# Patient Record
Sex: FEMALE | Employment: UNEMPLOYED | URBAN - METROPOLITAN AREA
[De-identification: names, ages, dates, MRNs, and addresses within clinical notes are randomized per-mention and may not be internally consistent; named-entity substitution may affect disease eponyms.]

---

## 2017-04-01 ENCOUNTER — HOSPITAL ENCOUNTER (EMERGENCY)
Facility: CLINIC | Age: 22
Discharge: HOME | End: 2017-04-01
Attending: EMERGENCY MEDICINE | Admitting: EMERGENCY MEDICINE

## 2017-04-01 VITALS
DIASTOLIC BLOOD PRESSURE: 86 MMHG | SYSTOLIC BLOOD PRESSURE: 126 MMHG | TEMPERATURE: 98.7 F | WEIGHT: 180 LBS | HEART RATE: 83 BPM | RESPIRATION RATE: 16 BRPM | OXYGEN SATURATION: 99 % | BODY MASS INDEX: 27.37 KG/M2

## 2017-04-01 DIAGNOSIS — N76.0 BACTERIAL VAGINOSIS: ICD-10-CM

## 2017-04-01 DIAGNOSIS — B96.89 BACTERIAL VAGINOSIS: ICD-10-CM

## 2017-04-01 DIAGNOSIS — N97.0 ANOVULATORY (DYSFUNCTIONAL UTERINE) BLEEDING: ICD-10-CM

## 2017-04-01 LAB
B-HCG SERPL-ACNC: <1 IU/L (ref 0–5)
BASOPHILS # BLD AUTO: 0 10E9/L (ref 0–0.2)
BASOPHILS NFR BLD AUTO: 0.1 %
DIFFERENTIAL METHOD BLD: ABNORMAL
EOSINOPHIL # BLD AUTO: 0.1 10E9/L (ref 0–0.7)
EOSINOPHIL NFR BLD AUTO: 1.5 %
ERYTHROCYTE [DISTWIDTH] IN BLOOD BY AUTOMATED COUNT: 12.3 % (ref 10–15)
HCG UR QL: NEGATIVE
HCT VFR BLD AUTO: 40.3 % (ref 35–47)
HGB BLD-MCNC: 14.2 G/DL (ref 11.7–15.7)
IMM GRANULOCYTES # BLD: 0 10E9/L (ref 0–0.4)
IMM GRANULOCYTES NFR BLD: 0.3 %
INTERNAL QC OK POCT: YES
LYMPHOCYTES # BLD AUTO: 3.7 10E9/L (ref 0.8–5.3)
LYMPHOCYTES NFR BLD AUTO: 46.8 %
MCH RBC QN AUTO: 33.6 PG (ref 26.5–33)
MCHC RBC AUTO-ENTMCNC: 35.2 G/DL (ref 31.5–36.5)
MCV RBC AUTO: 96 FL (ref 78–100)
MICRO REPORT STATUS: ABNORMAL
MONOCYTES # BLD AUTO: 0.4 10E9/L (ref 0–1.3)
MONOCYTES NFR BLD AUTO: 5.2 %
NEUTROPHILS # BLD AUTO: 3.6 10E9/L (ref 1.6–8.3)
NEUTROPHILS NFR BLD AUTO: 46.1 %
NRBC # BLD AUTO: 0 10*3/UL
NRBC BLD AUTO-RTO: 0 /100
PLATELET # BLD AUTO: 162 10E9/L (ref 150–450)
RBC # BLD AUTO: 4.22 10E12/L (ref 3.8–5.2)
SPECIMEN SOURCE: ABNORMAL
WBC # BLD AUTO: 7.9 10E9/L (ref 4–11)
WET PREP SPEC: ABNORMAL

## 2017-04-01 PROCEDURE — 87210 SMEAR WET MOUNT SALINE/INK: CPT | Performed by: EMERGENCY MEDICINE

## 2017-04-01 PROCEDURE — 87491 CHLMYD TRACH DNA AMP PROBE: CPT | Performed by: EMERGENCY MEDICINE

## 2017-04-01 PROCEDURE — 84702 CHORIONIC GONADOTROPIN TEST: CPT | Performed by: EMERGENCY MEDICINE

## 2017-04-01 PROCEDURE — 99284 EMERGENCY DEPT VISIT MOD MDM: CPT | Performed by: EMERGENCY MEDICINE

## 2017-04-01 PROCEDURE — 99283 EMERGENCY DEPT VISIT LOW MDM: CPT | Mod: Z6 | Performed by: EMERGENCY MEDICINE

## 2017-04-01 PROCEDURE — 81025 URINE PREGNANCY TEST: CPT | Performed by: EMERGENCY MEDICINE

## 2017-04-01 PROCEDURE — 87591 N.GONORRHOEAE DNA AMP PROB: CPT | Performed by: EMERGENCY MEDICINE

## 2017-04-01 PROCEDURE — 85025 COMPLETE CBC W/AUTO DIFF WBC: CPT | Performed by: EMERGENCY MEDICINE

## 2017-04-01 RX ORDER — METRONIDAZOLE 500 MG/1
500 TABLET ORAL 2 TIMES DAILY
Qty: 14 TABLET | Refills: 0 | Status: SHIPPED | OUTPATIENT
Start: 2017-04-01 | End: 2017-04-08

## 2017-04-01 ASSESSMENT — ENCOUNTER SYMPTOMS
EYE REDNESS: 0
HEADACHES: 0
COLOR CHANGE: 0
SHORTNESS OF BREATH: 0
NECK STIFFNESS: 0
ENDOCRINE COMMENTS: LACTATING
ARTHRALGIAS: 0
CONFUSION: 0
DIFFICULTY URINATING: 0
ABDOMINAL PAIN: 0
FEVER: 0

## 2017-04-01 NOTE — ED AVS SNAPSHOT
[facility], Emergency Department    [address]    [place] [address]    Phone:  [phone]    Fax:  [phone]                                       Shira Cruz   MRN: [id number]    Department:  [facility], Emergency Department   Date of Visit:  4/1/2017           Patient Information     Date Of Birth          1995        Your diagnoses for this visit were:     Anovulatory (dysfunctional uterine) bleeding     Bacterial vaginosis        You were seen by Payton Andrade MD.        Discharge Instructions       Please make an appointment to follow up with OB/Gyn--[facility] (phone: [phone]) or [facility]yn--[facility] (phone: [phone]) in 7-10 days.  Take Flagyl as directed for bacterial vaginosis.  Do not drink alcohol with this medication.     Discharge References/Attachments     BACTERIAL VAGINOSIS (BV) (ENGLISH)    DYSFUNCTIONAL UTERINE BLEEDING (ENGLISH)      24 Hour Appointment Hotline       To make an appointment at any [place] clinic, call [phone]). If you don't have a family doctor or clinic, we will help you find one. [place] clinics are conveniently located to serve the needs of you and your family.             Review of your medicines      START taking        Dose / Directions Last dose taken    metroNIDAZOLE 500 MG tablet   Commonly known as:  FLAGYL   Dose:  500 mg   Quantity:  14 tablet        Take 1 tablet (500 mg) by mouth 2 times daily for 7 days   Refills:  0          Our records show that you are taking the medicines listed below. If these are incorrect, please call your family doctor or clinic.        Dose / Directions Last dose taken    ibuprofen 200 MG tablet   Commonly known as:  ADVIL/MOTRIN   Dose:  400 mg   Quantity:  60 tablet        Take 2 tablets by mouth every 6 hours as needed for pain.   Refills:  0        KEFLEX PO   Dose:  500 mg        Take 500 mg by mouth   Refills:  0        oxyCODONE 5 MG IR  "tablet   Commonly known as:  ROXICODONE   Dose:  5 mg   Quantity:  5 tablet        Take 1 tablet by mouth every 4 hours as needed for pain for 5 doses.   Refills:  0                Prescriptions were sent or printed at these locations (1 Prescription)                   Other Prescriptions                Printed at Department/Unit printer (1 of 1)         metroNIDAZOLE (FLAGYL) 500 MG tablet                Procedures and tests performed during your visit     CBC with platelets differential    Chlamydia trachomatis PCR    HCG quantitative pregnancy (blood)    Neisseria gonorrhoeae PCR    Wet prep    hCG qual urine POCT      Orders Needing Specimen Collection     None      Pending Results     Date and Time Order Name Status Description    4/1/2017 2108 Neisseria gonorrhoeae PCR In process     4/1/2017 2108 Chlamydia trachomatis PCR In process             Pending Culture Results     Date and Time Order Name Status Description    4/1/2017 2108 Neisseria gonorrhoeae PCR In process     4/1/2017 2108 Chlamydia trachomatis PCR In process             Thank you for choosing [facility]       Thank you for choosing [facility] for your care. Our goal is always to provide you with excellent care. Hearing back from our patients is one way we can continue to improve our services. Please take a few minutes to complete the written survey that you may receive in the mail after you visit with us. Thank you!        5[employer]uan Information     5[employer]uan lets you send messages to your doctor, view your test results, renew your prescriptions, schedule appointments and more. To sign up, go to [email or web address] . Click on \"Log in\" on the left side of the screen, which will take you to the Welcome page. Then click on \"Sign up Now\" on the right side of the page.     You will be asked to enter the access code listed below, as well as some personal information. Please follow the directions to create your username and password.     Your access code " is: [email or web address]  Expires: 2017 10:38 PM     Your access code will  in 90 days. If you need help or a new code, please call your [place] clinic or [phone].        Care EveryWhere ID     This is your Care EveryWhere ID. This could be used by other organizations to access your [place] medical records  [id number]        After Visit Summary       This is your record. Keep this with you and show to your community pharmacist(s) and doctor(s) at your next visit.

## 2017-04-01 NOTE — ED AVS SNAPSHOT
[facility], [place], Emergency Department    [address]    [place] [address]    Phone:  [phone]    Fax:  [phone]                                       Shira Cruz   MRN: [id number]    Department:  [facility], Emergency Department   Date of Visit:  4/1/2017           After Visit Summary Signature Page     I have received my discharge instructions, and my questions have been answered. I have discussed any challenges I see with this plan with the nurse or doctor.    ..........................................................................................................................................  Patient/Patient Representative Signature      ..........................................................................................................................................  Patient Representative Print Name and Relationship to Patient    ..................................................               ................................................  Date                                            Time    ..........................................................................................................................................  Reviewed by Signature/Title    ...................................................              ..............................................  Date                                                            Time

## 2017-04-02 LAB
C TRACH DNA SPEC QL NAA+PROBE: NORMAL
N GONORRHOEA DNA SPEC QL NAA+PROBE: NORMAL
SPECIMEN SOURCE: NORMAL
SPECIMEN SOURCE: NORMAL

## 2017-04-02 NOTE — ED PROVIDER NOTES
[facility] EMERGENCY DEPARTMENT ([facility])    2017    ED 5  9:04 PM   History     Chief Complaint   Patient presents with     Vaginal Bleeding     Red/brown vaginal bleeding started this am and changed two pads. don't know how far but home test positive on 2017. abdominal cramping and pressure pain.     HPI  Shira Cruz is a 21 year old  female who presents with reddish brown vaginal bleeding that started this morning. She had a positive home pregnancy test on 17. She is , with miscarriages at 3 months and 4 months. She developed brown discharge with moderate vaginal bleeding and cramping this morning. She has changed 2 pads so far. She is lactating. Patient doesn't know what her blood type is. Per [facility] records and blood testing at [facility] she is Rh positive. Per Epic records she had a Depo Provera shot in 2016.  She has abdominal cramping.  She denies fevers, nausea, or vomiting.    Additional history as of the patient's 2nd pregnancy was a molar pregnancy.  It ended in 2015.  Her breasts had leaked milk during that pregnancy.  She received a depo shot in 2016.  Her breasts have been leaking milk since that time.  She missed her depo shot in February.    I have reviewed the Medications, Allergies, Past Medical and Surgical History, and Social History in the [facility] system.  PAST MEDICAL HISTORY: History reviewed. No pertinent past medical history.    PAST SURGICAL HISTORY: History reviewed. No pertinent surgical history.    FAMILY HISTORY: No family history on file.    SOCIAL HISTORY:   Social History   Substance Use Topics     Smoking status: Current Every Day Smoker     Smokeless tobacco: Never Used     Alcohol use No       Patient's Medications   New Prescriptions    METRONIDAZOLE (FLAGYL) 500 MG TABLET    Take 1 tablet (500 mg) by mouth 2 times daily for 7 days   Previous Medications    CEPHALEXIN (KEFLEX PO)    Take 500  mg by mouth    IBUPROFEN (ADVIL,MOTRIN) 200 MG TABLET    Take 2 tablets by mouth every 6 hours as needed for pain.    OXYCODONE (ROXICODONE) 5 MG IMMEDIATE RELEASE TABLET    Take 1 tablet by mouth every 4 hours as needed for pain for 5 doses.   Modified Medications    No medications on file   Discontinued Medications    No medications on file          Allergies   Allergen Reactions     Banana Swelling     Cantaloupe [Melon] Swelling     Celery Oil Swelling      Review of Systems   Constitutional: Negative for fever.   HENT: Negative for congestion.    Eyes: Negative for redness.   Respiratory: Negative for shortness of breath.    Cardiovascular: Negative for chest pain.   Gastrointestinal: Negative for abdominal pain.   Endocrine:        Lactating   Genitourinary: Positive for pelvic pain (cramping), vaginal bleeding and vaginal discharge. Negative for difficulty urinating.   Musculoskeletal: Negative for arthralgias and neck stiffness.   Skin: Negative for color change.   Neurological: Negative for headaches.   Psychiatric/Behavioral: Negative for confusion.       Physical Exam   BP: 137/84  Pulse: 102  Temp: 98.5  F (36.9  C)  Resp: 16  SpO2: 96 %  Physical Exam  Physical Exam   Constitutional:   well nourished, well developed, resting comfortably   HENT:   Head: Normocephalic and atraumatic.   Eyes: Conjunctivae are normal. Pupils are equal, round, and reactive to light.   TMS are clear, pharynx has no erythema or exudate, mucous membranes are moist  Neck:   no adenopathy, no bony tenderness  Cardiovascular: regular rate and rhythm without murmurs or gallops  Pulmonary/Chest: Clear to auscultation bilaterally, with no wheezes or retractions. No respiratory distress.  GI: Soft with good bowel sounds.  Non-tender, non-distended, with no guarding, no rebound, no peritoneal signs.   : Shaved pudenda, external genitalia is within normal limits, brownish discharge in vaginal vault, os is closed, no cervical motion  tenderness.  Back:  No bony or CVA tenderness   Musculoskeletal:  no edema or clubbing   Skin: Skin is warm and dry. No rash noted.   Neurological: alert and oriented to person, place, and time. Nonfocal exam  Psychiatric:  normal mood and affect.   ED Course     ED Course     Procedures             Critical Care time:  none             Results for orders placed or performed during the hospital encounter of 04/01/17 (from the past 24 hour(s))   hCG qual urine POCT   Result Value Ref Range    HCG Qual Urine Negative neg    Internal QC OK Yes    HCG quantitative pregnancy (blood)   Result Value Ref Range    HCG Quantitative Serum <1 0 - 5 IU/L   CBC with platelets differential   Result Value Ref Range    WBC 7.9 4.0 - 11.0 10e9/L    RBC Count 4.22 3.8 - 5.2 10e12/L    Hemoglobin 14.2 11.7 - 15.7 g/dL    Hematocrit 40.3 35.0 - 47.0 %    MCV 96 78 - 100 fl    MCH 33.6 (H) 26.5 - 33.0 pg    MCHC 35.2 31.5 - 36.5 g/dL    RDW 12.3 10.0 - 15.0 %    Platelet Count 162 150 - 450 10e9/L    Diff Method Automated Method     % Neutrophils 46.1 %    % Lymphocytes 46.8 %    % Monocytes 5.2 %    % Eosinophils 1.5 %    % Basophils 0.1 %    % Immature Granulocytes 0.3 %    Nucleated RBCs 0 0 /100    Absolute Neutrophil 3.6 1.6 - 8.3 10e9/L    Absolute Lymphocytes 3.7 0.8 - 5.3 10e9/L    Absolute Monocytes 0.4 0.0 - 1.3 10e9/L    Absolute Eosinophils 0.1 0.0 - 0.7 10e9/L    Absolute Basophils 0.0 0.0 - 0.2 10e9/L    Abs Immature Granulocytes 0.0 0 - 0.4 10e9/L    Absolute Nucleated RBC 0.0    Wet prep   Result Value Ref Range    Specimen Description Cervix     Wet Prep (A)      No Trichomonas seen  No yeast seen  Few PMNs seen  Few Clue cells seen      Micro Report Status FINAL 04/01/2017       Results for orders placed or performed during the hospital encounter of 04/01/17 (from the past 24 hour(s))   hCG qual urine POCT   Result Value Ref Range    HCG Qual Urine Negative neg    Internal QC OK Yes    HCG quantitative pregnancy (blood)    Result Value Ref Range    HCG Quantitative Serum <1 0 - 5 IU/L   CBC with platelets differential   Result Value Ref Range    WBC 7.9 4.0 - 11.0 10e9/L    RBC Count 4.22 3.8 - 5.2 10e12/L    Hemoglobin 14.2 11.7 - 15.7 g/dL    Hematocrit 40.3 35.0 - 47.0 %    MCV 96 78 - 100 fl    MCH 33.6 (H) 26.5 - 33.0 pg    MCHC 35.2 31.5 - 36.5 g/dL    RDW 12.3 10.0 - 15.0 %    Platelet Count 162 150 - 450 10e9/L    Diff Method Automated Method     % Neutrophils 46.1 %    % Lymphocytes 46.8 %    % Monocytes 5.2 %    % Eosinophils 1.5 %    % Basophils 0.1 %    % Immature Granulocytes 0.3 %    Nucleated RBCs 0 0 /100    Absolute Neutrophil 3.6 1.6 - 8.3 10e9/L    Absolute Lymphocytes 3.7 0.8 - 5.3 10e9/L    Absolute Monocytes 0.4 0.0 - 1.3 10e9/L    Absolute Eosinophils 0.1 0.0 - 0.7 10e9/L    Absolute Basophils 0.0 0.0 - 0.2 10e9/L    Abs Immature Granulocytes 0.0 0 - 0.4 10e9/L    Absolute Nucleated RBC 0.0    Wet prep   Result Value Ref Range    Specimen Description Cervix     Wet Prep (A)      No Trichomonas seen  No yeast seen  Few PMNs seen  Few Clue cells seen      Micro Report Status FINAL 04/01/2017      Medications - No data to display      Assessments & Plan (with Medical Decision Making)       I have reviewed the nursing notes.  EMERGENCY DEPARTMENT COURSE: Patient was seen and examined at 2104 pm.   Urine HCG is negative.  Hemoglobin is 14.2.  Wet prep shows clue cells suggestive of bacterial vaginosis.  GC and chlamydia are pending.  I did obtain a blood hCG, which results as <1.  I consulted ObGyn history regarding the patient's leaking of breast milk.  OB states this could be hormonal or mechanical and should be followed up in clinic.  I believe the patient is safe to discharged home.  I prescribed Flagyl for bacterial vaginosis.  She should not drink alcohol when taking Flagyl.  I suspect she is having anovulatory bleeding. She was discharged with bleeding precautions.    She should follow up with the ObGyn  clinic within 1-2 weeks.          I have reviewed the findings, diagnosis, plan and need for follow up with the patient.    New Prescriptions    METRONIDAZOLE (FLAGYL) 500 MG TABLET    Take 1 tablet (500 mg) by mouth 2 times daily for 7 days       Final diagnoses:   Anovulatory (dysfunctional uterine) bleeding   Bacterial vaginosis   I, Josette De Dios, am serving as a trained medical scribe to document services personally performed by Payton Andrade MD, based on the provider's statements to me.  This document has been checked and approved by Dr. Andrade.    I, Payton Andrade MD, personally performed the services described in this documentation, as scribed by Josette De Dios in my presence, and have reviewed and approve of this documentation.     This note was created in part by the use of Dragon voice recognition dictation system. Inadvertent grammatical errors and typographical errors may still exist.  Payton Andrade MD      4/1/2017   [facility] [place], EMERGENCY DEPARTMENT     Payton Andrade MD  04/01/17 2233

## 2017-04-02 NOTE — ED NOTES
Red/brown vaginal bleeding started this am and changed two pads. don't know how far but home test positive on Jan 25th 2017. abdominal cramping and pressure pain.

## 2017-04-02 NOTE — DISCHARGE INSTRUCTIONS
Please make an appointment to follow up with OB/Gyn--[facility] (phone: [phone]) or [facility]yn--[facility] (phone: [phone]) in 7-10 days.  Take Flagyl as directed for bacterial vaginosis.  Do not drink alcohol with this medication.

## 2017-07-28 ENCOUNTER — OFFICE VISIT (OUTPATIENT)
Dept: URGENT CARE | Facility: URGENT CARE | Age: 22
End: 2017-07-28

## 2017-07-28 VITALS
BODY MASS INDEX: 25.87 KG/M2 | OXYGEN SATURATION: 98 % | SYSTOLIC BLOOD PRESSURE: 139 MMHG | WEIGHT: 170.13 LBS | HEART RATE: 105 BPM | TEMPERATURE: 98.7 F | DIASTOLIC BLOOD PRESSURE: 81 MMHG

## 2017-07-28 DIAGNOSIS — B96.89 BV (BACTERIAL VAGINOSIS): Primary | ICD-10-CM

## 2017-07-28 DIAGNOSIS — N76.0 BV (BACTERIAL VAGINOSIS): Primary | ICD-10-CM

## 2017-07-28 DIAGNOSIS — N89.8 VAGINAL DISCHARGE: ICD-10-CM

## 2017-07-28 LAB
MICRO REPORT STATUS: ABNORMAL
SPECIMEN SOURCE: ABNORMAL
WET PREP SPEC: ABNORMAL

## 2017-07-28 PROCEDURE — 87210 SMEAR WET MOUNT SALINE/INK: CPT | Performed by: PHYSICIAN ASSISTANT

## 2017-07-28 PROCEDURE — 99202 OFFICE O/P NEW SF 15 MIN: CPT | Performed by: PHYSICIAN ASSISTANT

## 2017-07-28 RX ORDER — METRONIDAZOLE 500 MG/1
500 TABLET ORAL 2 TIMES DAILY
Qty: 14 TABLET | Refills: 0 | Status: SHIPPED | OUTPATIENT
Start: 2017-07-28

## 2017-07-28 RX ORDER — ALBUTEROL SULFATE 90 UG/1
2 AEROSOL, METERED RESPIRATORY (INHALATION) EVERY 6 HOURS
COMMUNITY

## 2017-07-29 NOTE — NURSING NOTE
"Chief Complaint   Patient presents with     Vaginal Problem     Was dx 2 mos ago with this, thinks it is back, vagainal discharge for about one week       Initial /81 (BP Location: Left arm, Patient Position: Supine, Cuff Size: Adult Regular)  Pulse 105  Temp 98.7  F (37.1  C) (Oral)  Wt 170 lb 2 oz (77.2 kg)  SpO2 98%  Breastfeeding? No  BMI 25.87 kg/m2 Estimated body mass index is 25.87 kg/(m^2) as calculated from the following:    Height as of 2/7/15: 5' 8\" (1.727 m).    Weight as of this encounter: 170 lb 2 oz (77.2 kg).  Medication Reconciliation: complete   Radha Lujan MA    "

## 2017-07-29 NOTE — PROGRESS NOTES
SUBJECTIVE:                                                    Shira Cruz is a 21 year old female who presents to clinic today for the following health issues:      Vaginal Symptoms      Duration: For about one week - history of recurrent BV infections    Description  vaginal discharge - white and odor    Intensity:  moderate    Accompanying signs and symptoms (fever/dysuria/abdominal or back pain): None    History  Sexually active: yes, not sexually active  Possibility of pregnancy: No  Recent antibiotic use: no     Precipitating or alleviating factors: None    Therapies tried and outcome: none   Outcome: n/a      Problem list and histories reviewed & adjusted, as indicated.  Additional history: as documented    There is no problem list on file for this patient.    No past surgical history on file.    Social History   Substance Use Topics     Smoking status: Current Every Day Smoker     Smokeless tobacco: Never Used     Alcohol use No     No family history on file.      Current Outpatient Prescriptions   Medication Sig Dispense Refill     metroNIDAZOLE (FLAGYL) 500 MG tablet Take 1 tablet (500 mg) by mouth 2 times daily 14 tablet 0     albuterol (PROAIR HFA/PROVENTIL HFA/VENTOLIN HFA) 108 (90 BASE) MCG/ACT Inhaler Inhale 2 puffs into the lungs every 6 hours       ibuprofen (ADVIL,MOTRIN) 200 MG tablet Take 2 tablets by mouth every 6 hours as needed for pain. (Patient not taking: Reported on 7/28/2017) 60 tablet 0     Allergies   Allergen Reactions     Banana Swelling     Cantaloupe [Melon] Swelling     Celery Oil Swelling     BP Readings from Last 3 Encounters:   07/28/17 139/81   04/01/17 126/86   11/03/16 99/44    Wt Readings from Last 3 Encounters:   07/28/17 170 lb 2 oz (77.2 kg) (92 %)*   04/01/17 180 lb (81.6 kg) (95 %)*   02/07/15 150 lb (68 kg) (85 %)*     * Growth percentiles are based on CDC 2-20 Years data.                        Reviewed and updated as needed this visit by clinical  staffTobacco  Allergies  Meds       Reviewed and updated as needed this visit by Provider         ROS:  Constitutional, HEENT, cardiovascular, pulmonary, gi and gu systems are negative, except as otherwise noted.      OBJECTIVE:   /81 (BP Location: Left arm, Patient Position: Supine, Cuff Size: Adult Regular)  Pulse 105  Temp 98.7  F (37.1  C) (Oral)  Wt 170 lb 2 oz (77.2 kg)  SpO2 98%  Breastfeeding? No  BMI 25.87 kg/m2  Body mass index is 25.87 kg/(m^2).  GENERAL: healthy, alert and no distress    Diagnostic Test Results:  Wet prep- Clue cells seen    ASSESSMENT/PLAN:       ICD-10-CM    1. Vaginal discharge N89.8 Wet prep   2. BV (bacterial vaginosis) N76.0 metroNIDAZOLE (FLAGYL) 500 MG tablet    B96.89        Take meds and prescribed and f/u with OB/GYN for concerns regarding recurrent infections.  The patient indicates understanding of these issues and agrees with the plan.      Halima Muprhy PA-C  [facility]

## 2017-09-03 ENCOUNTER — HEALTH MAINTENANCE LETTER (OUTPATIENT)
Age: 22
End: 2017-09-03

## 2019-02-15 ENCOUNTER — HEALTH MAINTENANCE LETTER (OUTPATIENT)
Age: 24
End: 2019-02-15

## 2019-11-07 ENCOUNTER — HEALTH MAINTENANCE LETTER (OUTPATIENT)
Age: 24
End: 2019-11-07

## 2020-02-23 ENCOUNTER — HEALTH MAINTENANCE LETTER (OUTPATIENT)
Age: 25
End: 2020-02-23

## 2020-11-29 ENCOUNTER — HEALTH MAINTENANCE LETTER (OUTPATIENT)
Age: 25
End: 2020-11-29

## 2021-09-25 ENCOUNTER — HEALTH MAINTENANCE LETTER (OUTPATIENT)
Age: 26
End: 2021-09-25

## 2022-01-15 ENCOUNTER — HEALTH MAINTENANCE LETTER (OUTPATIENT)
Age: 27
End: 2022-01-15

## 2022-12-26 ENCOUNTER — HEALTH MAINTENANCE LETTER (OUTPATIENT)
Age: 27
End: 2022-12-26

## 2023-04-22 ENCOUNTER — HEALTH MAINTENANCE LETTER (OUTPATIENT)
Age: 28
End: 2023-04-22

## 2024-11-04 NOTE — MR AVS SNAPSHOT
After Visit Summary   7/28/2017    Shira Cruz    MRN: [id number]           Patient Information     Date Of Birth          1995        Visit Information        Provider Department      7/28/2017 8:10 PM Halima Murphy PA-C [facility]        Today's Diagnoses     BV (bacterial vaginosis)    -  1    Vaginal discharge           Follow-ups after your visit        Who to contact     If you have questions or need follow up information about today's clinic visit or your schedule please contact [facility] directly at [phone].  Normal or non-critical lab and imaging results will be communicated to you by Million-2-1hart, letter or phone within 4 business days after the clinic has received the results. If you do not hear from us within 7 days, please contact the clinic through NowledgeDatat or phone. If you have a critical or abnormal lab result, we will notify you by phone as soon as possible.  Submit refill requests through App [employer] or call your pharmacy and they will forward the refill request to us. Please allow 3 business days for your refill to be completed.          Additional Information About Your Visit        MyChart Information     [employer] gives you secure access to your electronic health record. If you see a primary care provider, you can also send messages to your care team and make appointments. If you have questions, please call your primary care clinic.  If you do not have a primary care provider, please call [phone] and they will assist you.        Care EveryWhere ID     This is your Care EveryWhere ID. This could be used by other organizations to access your [place] medical records  [id number]        Your Vitals Were     Pulse Temperature Pulse Oximetry Breastfeeding? BMI (Body Mass Index)       105 98.7  F (37.1  C) (Oral) 98% No 25.87 kg/m2        Blood Pressure from Last 3 Encounters:   07/28/17 139/81   04/01/17 126/86   11/03/16  99/44    Weight from Last 3 Encounters:   07/28/17 170 lb 2 oz (77.2 kg) (92 %)*   04/01/17 180 lb (81.6 kg) (95 %)*   02/07/15 150 lb (68 kg) (85 %)*     * Growth percentiles are based on [facility] 2-20 Years data.              We Performed the Following     Wet prep          Today's Medication Changes          These changes are accurate as of: 7/28/17  9:06 PM.  If you have any questions, ask your nurse or doctor.               Start taking these medicines.        Dose/Directions    metroNIDAZOLE 500 MG tablet   Commonly known as:  FLAGYL   Used for:  BV (bacterial vaginosis)   Started by:  Halima Murphy PA-C        Dose:  500 mg   Take 1 tablet (500 mg) by mouth 2 times daily   Quantity:  14 tablet   Refills:  0            Where to get your medicines      These medications were sent to [facility] [address] [place], [place] - [address]  [address] [address]    Hours:  24-hours Phone:  [phone]     metroNIDAZOLE 500 MG tablet                Primary Care Provider    Physician No Ref-Primary       No address on file        Equal Access to Services     CHUCK BERNABE AH: Hadtyree crowello Sosuyapaali, waaxda luqadaha, qaybta kaalmada adeegyada, arnel merritt. So [facility] [phone].    ATENCIÓN: Si habla español, tiene a doe disposición servicios gratuitos de asistencia lingüística. Niyaame al [phone].    We comply with applicable federal civil rights laws and [place] laws. We do not discriminate on the basis of race, color, national origin, age, disability sex, sexual orientation or gender identity.            Thank you!     Thank you for choosing [facility]  for your care. Our goal is always to provide you with excellent care. Hearing back from our patients is one way we can continue to improve our services. Please take a few minutes to complete the written survey that you may receive in  the mail after your visit with us. Thank you!             Your Updated Medication List - Protect others around you: Learn how to safely use, store and throw away your medicines at www.disposemymeds.org.          This list is accurate as of: 7/28/17  9:06 PM.  Always use your most recent med list.                   Brand Name Dispense Instructions for use Diagnosis    albuterol 108 (90 BASE) MCG/ACT Inhaler    PROAIR HFA/PROVENTIL HFA/VENTOLIN HFA     Inhale 2 puffs into the lungs every 6 hours        ibuprofen 200 MG tablet    ADVIL/MOTRIN    60 tablet    Take 2 tablets by mouth every 6 hours as needed for pain.        metroNIDAZOLE 500 MG tablet    FLAGYL    14 tablet    Take 1 tablet (500 mg) by mouth 2 times daily    BV (bacterial vaginosis)          04-Nov-2024 10:17